# Patient Record
Sex: MALE | Race: WHITE | RURAL
[De-identification: names, ages, dates, MRNs, and addresses within clinical notes are randomized per-mention and may not be internally consistent; named-entity substitution may affect disease eponyms.]

---

## 2017-12-12 ENCOUNTER — OFFICE VISIT (OUTPATIENT)
Dept: FAMILY MEDICINE CLINIC | Age: 27
End: 2017-12-12

## 2017-12-12 VITALS
OXYGEN SATURATION: 98 % | RESPIRATION RATE: 18 BRPM | HEIGHT: 73 IN | DIASTOLIC BLOOD PRESSURE: 73 MMHG | HEART RATE: 102 BPM | TEMPERATURE: 96.3 F | SYSTOLIC BLOOD PRESSURE: 119 MMHG | WEIGHT: 261 LBS | BODY MASS INDEX: 34.59 KG/M2

## 2017-12-12 DIAGNOSIS — K21.9 GASTROESOPHAGEAL REFLUX DISEASE WITHOUT ESOPHAGITIS: ICD-10-CM

## 2017-12-12 DIAGNOSIS — I10 ESSENTIAL HYPERTENSION: ICD-10-CM

## 2017-12-12 DIAGNOSIS — R68.83 CHILLS: Primary | ICD-10-CM

## 2017-12-12 DIAGNOSIS — R50.9 FEVER, UNSPECIFIED FEVER CAUSE: ICD-10-CM

## 2017-12-12 DIAGNOSIS — E29.1 HYPOGONADISM MALE: ICD-10-CM

## 2017-12-12 DIAGNOSIS — J02.9 SORE THROAT: ICD-10-CM

## 2017-12-12 LAB
BINAX NOW INFLUENZA: NEGATIVE
S PYO AG THROAT QL: NEGATIVE
VALID INTERNAL CONTROL?: YES

## 2017-12-12 RX ORDER — PSEUDOEPHEDRINE HCL 120 MG/1
120 TABLET, FILM COATED, EXTENDED RELEASE ORAL
Qty: 20 TAB | Refills: 1 | Status: SHIPPED | OUTPATIENT
Start: 2017-12-12

## 2017-12-12 RX ORDER — CLONIDINE HYDROCHLORIDE 0.2 MG/1
TABLET ORAL 2 TIMES DAILY
COMMUNITY

## 2017-12-12 RX ORDER — TESTOSTERONE GEL, 1% 10 MG/G
50 GEL TRANSDERMAL DAILY
COMMUNITY

## 2017-12-12 RX ORDER — PREDNISONE 20 MG/1
TABLET ORAL
Qty: 15 TAB | Refills: 0 | Status: SHIPPED | OUTPATIENT
Start: 2017-12-12

## 2017-12-12 RX ORDER — OMEPRAZOLE 10 MG/1
10 CAPSULE, DELAYED RELEASE ORAL DAILY
COMMUNITY

## 2017-12-12 NOTE — PROGRESS NOTES
Damari Hurtado is a 33 y.o. male who presents with the following:  Chief Complaint   Patient presents with    Flu       Flu    The history is provided by the patient (Patient complains of chills body aches and feeling feverish but has not taken his temperature. ). Associated symptoms include sore throat. Pertinent negatives include no chest pain, no vomiting, no congestion, no headaches, no cough, no shortness of breath and no rash. Not on File    Current Outpatient Prescriptions   Medication Sig    cloNIDine HCl (CATAPRES) 0.2 mg tablet Take  by mouth two (2) times a day.  testosterone (ANDROGEL) 1 % (50 mg/5 gram) glpk 50 mg by TransDERmal route daily.  omeprazole (PRILOSEC) 10 mg capsule Take 10 mg by mouth daily.  predniSONE (DELTASONE) 20 mg tablet Take 5 tablets day one, take 4 tablets day two, take 3 tablets day three, take 2 tablets day four, take 1 tablet day five.  pseudoephedrine CR (SUDAFED 12 HOUR) 120 mg CR tablet Take 1 Tab by mouth two (2) times daily as needed for Congestion. No current facility-administered medications for this visit. Past Medical History:   Diagnosis Date    GERD (gastroesophageal reflux disease)     HTN (hypertension)     Testosterone deficiency        Past Surgical History:   Procedure Laterality Date    HX CARPAL TUNNEL RELEASE      right 5th metacarpal       Family History   Problem Relation Age of Onset    Cancer Father        Social History     Social History    Marital status:      Spouse name: N/A    Number of children: N/A    Years of education: N/A     Social History Main Topics    Smoking status: Current Every Day Smoker    Smokeless tobacco: Never Used    Alcohol use 18.0 oz/week     30 Cans of beer per week      Comment: 30 per week    Drug use: None    Sexual activity: Not Asked     Other Topics Concern    None     Social History Narrative    None       Review of Systems   Constitutional: Positive for chills.  Negative for fever, malaise/fatigue and weight loss. HENT: Positive for sore throat. Negative for congestion, hearing loss and tinnitus. Eyes: Negative for blurred vision, pain and discharge. Respiratory: Negative for cough, shortness of breath and wheezing. Cardiovascular: Negative for chest pain, palpitations, orthopnea, claudication and leg swelling. Gastrointestinal: Negative for abdominal pain, constipation, heartburn, nausea and vomiting. Genitourinary: Negative for dysuria, frequency and urgency. Musculoskeletal: Positive for myalgias. Negative for falls and joint pain. Skin: Negative for itching and rash. Neurological: Negative for dizziness, tingling, tremors and headaches. Endo/Heme/Allergies: Negative for environmental allergies and polydipsia. Psychiatric/Behavioral: Negative for depression and substance abuse. The patient is not nervous/anxious. Visit Vitals    /73 (BP 1 Location: Left arm, BP Patient Position: Sitting)    Pulse (!) 102    Temp 96.3 °F (35.7 °C)    Resp 18    Ht 6' 1\" (1.854 m)    Wt 261 lb (118.4 kg)    SpO2 98%    BMI 34.43 kg/m2     Physical Exam   Constitutional: He is oriented to person, place, and time. He appears distressed. HENT:   Head: Normocephalic and atraumatic. Right Ear: External ear normal.   Left Ear: External ear normal.   Mouth/Throat: No oropharyngeal exudate. Patient with a pharyngitis   Eyes: Conjunctivae and EOM are normal. Pupils are equal, round, and reactive to light. Right eye exhibits no discharge. Left eye exhibits no discharge. Neck: Normal range of motion. Neck supple. No tracheal deviation present. No thyromegaly present. Cardiovascular: Normal rate, regular rhythm, normal heart sounds and intact distal pulses. Exam reveals no gallop and no friction rub. No murmur heard. Pulmonary/Chest: Effort normal and breath sounds normal. No stridor. No respiratory distress. He has no wheezes. He has no rales.  He exhibits no tenderness. Abdominal: He exhibits no distension and no mass. There is no tenderness. There is no guarding. Musculoskeletal: He exhibits no edema or tenderness. Lymphadenopathy:     He has cervical adenopathy. Neurological: He is alert and oriented to person, place, and time. He has normal reflexes. Gait normal.   Skin: Skin is warm and dry. No rash noted. He is not diaphoretic. No erythema. Psychiatric: Mood, memory, affect and judgment normal.         ICD-10-CM ICD-9-CM    1. Chills R68.83 780.64 cloNIDine HCl (CATAPRES) 0.2 mg tablet      testosterone (ANDROGEL) 1 % (50 mg/5 gram) glpk      omeprazole (PRILOSEC) 10 mg capsule      AMB POC BINAX NOW INFLUENZA TEST      predniSONE (DELTASONE) 20 mg tablet      pseudoephedrine CR (SUDAFED 12 HOUR) 120 mg CR tablet   2. Fever, unspecified fever cause R50.9 780.60 cloNIDine HCl (CATAPRES) 0.2 mg tablet      testosterone (ANDROGEL) 1 % (50 mg/5 gram) glpk      omeprazole (PRILOSEC) 10 mg capsule      AMB POC BINAX NOW INFLUENZA TEST      predniSONE (DELTASONE) 20 mg tablet      pseudoephedrine CR (SUDAFED 12 HOUR) 120 mg CR tablet   3. Gastroesophageal reflux disease without esophagitis K21.9 530.81    4. Essential hypertension I10 401.9    5. Hypogonadism male E29.1 257.2        Orders Placed This Encounter    AMB POC BINAX NOW INFLUENZA TEST    cloNIDine HCl (CATAPRES) 0.2 mg tablet     Sig: Take  by mouth two (2) times a day.  testosterone (ANDROGEL) 1 % (50 mg/5 gram) glpk     Si mg by TransDERmal route daily.  omeprazole (PRILOSEC) 10 mg capsule     Sig: Take 10 mg by mouth daily.  predniSONE (DELTASONE) 20 mg tablet     Sig: Take 5 tablets day one, take 4 tablets day two, take 3 tablets day three, take 2 tablets day four, take 1 tablet day five. Dispense:  15 Tab     Refill:  0    pseudoephedrine CR (SUDAFED 12 HOUR) 120 mg CR tablet     Sig: Take 1 Tab by mouth two (2) times daily as needed for Congestion.      Dispense: 20 Tab     Refill:  1       Follow-up Disposition: Not on Nery Camarillo MD

## 2022-07-26 NOTE — MR AVS SNAPSHOT
Visit Information Date & Time Provider Department Dept. Phone Encounter #  
 12/12/2017  8:00 AM Carlos Cedeño MD [facility] [phone] [id number] Upcoming Health Maintenance Date Due DTaP/Tdap/Td series (2 - Td) 12/12/2027 Allergies as of 12/12/2017  Review Complete On: 12/12/2017 By: Carlos Cedeño MD  
 Not on File Current Immunizations  Never Reviewed No immunizations on file. Not reviewed this visit You Were Diagnosed With   
  
 Codes Comments Chills    -  Primary ICD-10-CM: R68.83 ICD-9-CM: 780.64 Fever, unspecified fever cause     ICD-10-CM: R50.9 ICD-9-CM: 780.60 Gastroesophageal reflux disease without esophagitis     ICD-10-CM: K21.9 ICD-9-CM: 530.81 Essential hypertension     ICD-10-CM: I10 
ICD-9-CM: 401.9 Hypogonadism male     ICD-10-CM: E29.1 ICD-9-CM: 257.2 Vitals BP Pulse Temp Resp Height(growth percentile) Weight(growth percentile) 119/73 (BP 1 Location: Left arm, BP Patient Position: Sitting) (!) 102 96.3 °F (35.7 °C) 18 6' 1\" (1.854 m) 261 lb (118.4 kg) SpO2 BMI Smoking Status 98% 34.43 kg/m2 Current Every Day Smoker Vitals History BMI and BSA Data Body Mass Index Body Surface Area 34.43 kg/m 2 2.47 m 2 Preferred Pharmacy Pharmacy Name Phone [facility] [place] [address] [phone] Your Updated Medication List  
  
   
This list is accurate as of: 12/12/17  9:16 AM.  Always use your most recent med list.  
  
  
  
  
 cloNIDine HCl 0.2 mg tablet Commonly known as:  CATAPRES Take  by mouth two (2) times a day. predniSONE 20 mg tablet Commonly known as:  Nanine Knife Take 5 tablets day one, take 4 tablets day two, take 3 tablets day three, take 2 tablets day four, take 1 tablet day five. PriLOSEC 10 mg capsule Generic drug:  omeprazole Take 10 mg by mouth daily. pseudoephedrine  mg CR tablet Commonly known as:  SUDAFED 12 HOUR Take 1 Tab by mouth two (2) times daily as needed for Congestion. testosterone 1 % (50 mg/5 gram) Glpk Commonly known as:  ANDROGEL 50 mg by TransDERmal route daily. Prescriptions Sent to Pharmacy Refills  
 predniSONE (DELTASONE) 20 mg tablet 0 Sig: Take 5 tablets day one, take 4 tablets day two, take 3 tablets day three, take 2 tablets day four, take 1 tablet day five. Class: Normal  
 Pharmacy: [id number] [place] Ph #: [phone]  
 pseudoephedrine CR (SUDAFED 12 HOUR) 120 mg CR tablet 1 Sig: Take 1 Tab by mouth two (2) times daily as needed for Congestion. Class: Normal  
 Pharmacy: [id number] [facility], [facility] [address] Ph #: [phone] Route: Oral  
  
We Performed the Following AMB POC BINAX NOW INFLUENZA TEST [27109 CPT(R)] Introducing [facility] [place] [facility]! [employer] introduces "[employer] patient portal. Now you can access parts of your medical record, email your doctor's office, and request medication refills online. 1. In your internet browser, go to [email or web address] 2. Click on the First Time User? Click Here link in the Sign In box. You will see the New Member Sign Up page. 3. Enter your "[employer] Access Code exactly as it appears below. You will not need to use this code after youve completed the sign-up process. If you do not sign up before the expiration date, you must request a new code. · "[employer] Access Code: [email or web address] Expires: 3/12/2018  9:16 AM 
 
4. Enter the last four digits of your Social Security Number (xxxx) and Date of Birth (mm/dd/yyyy) as indicated and click Submit. You will be taken to the next sign-up page. 5. Create a "[employer] ID.  This will be your "[employer] login ID and cannot be changed, so think of one that is secure and easy to remember. 6. Create a [employer] password. You can change your password at any time. 7. Enter your Password Reset Question and Answer. This can be used at a later time if you forget your password. 8. Enter your e-mail address. You will receive e-mail notification when new information is available in [address] 9. Click Sign Up. You can now view and download portions of your medical record. 10. Click the Download Summary menu link to download a portable copy of your medical information. If you have questions, please visit the Frequently Asked Questions section of the [employer] website. Remember, [employer] is NOT to be used for urgent needs. For medical emergencies, dial 911. Now available from your iPhone and Android! Please provide this summary of care documentation to your next provider. If you have any questions after today's visit, please call [phone]. Other, specify...